# Patient Record
Sex: MALE | URBAN - METROPOLITAN AREA
[De-identification: names, ages, dates, MRNs, and addresses within clinical notes are randomized per-mention and may not be internally consistent; named-entity substitution may affect disease eponyms.]

---

## 2023-10-19 ENCOUNTER — OFFICE VISIT (OUTPATIENT)
Dept: URBAN - METROPOLITAN AREA CLINIC 92 | Facility: CLINIC | Age: 18
End: 2023-10-19

## 2023-10-19 NOTE — HPI-TODAY'S VISIT:
This is a 18-year-old male referred by Nu Stone NP of [facility] for GI consultation of abdominal pain, nausea and vomiting and a copy of this note be sent to the referring provider.